# Patient Record
Sex: FEMALE | Race: BLACK OR AFRICAN AMERICAN | NOT HISPANIC OR LATINO | Employment: FULL TIME | URBAN - METROPOLITAN AREA
[De-identification: names, ages, dates, MRNs, and addresses within clinical notes are randomized per-mention and may not be internally consistent; named-entity substitution may affect disease eponyms.]

---

## 2024-02-07 PROBLEM — J01.90 ACUTE RHINOSINUSITIS: Status: ACTIVE | Noted: 2024-02-07

## 2024-02-07 PROBLEM — S49.90XA INJURY OF UPPER EXTREMITY: Status: ACTIVE | Noted: 2024-02-07

## 2024-02-07 PROBLEM — D64.9 ANEMIA: Status: ACTIVE | Noted: 2024-02-07

## 2024-02-07 PROBLEM — R52 BODY ACHES: Status: ACTIVE | Noted: 2024-02-07

## 2024-02-07 PROBLEM — R79.89 LOW TSH LEVEL: Status: ACTIVE | Noted: 2024-02-07

## 2024-02-07 PROBLEM — M25.519 SHOULDER PAIN: Status: ACTIVE | Noted: 2024-02-07

## 2024-02-07 PROBLEM — G47.10 HYPERSOMNIA: Status: ACTIVE | Noted: 2024-02-07

## 2024-02-07 PROBLEM — R53.83 FATIGUE: Status: ACTIVE | Noted: 2024-02-07

## 2024-02-07 PROBLEM — D50.9 ANEMIA, IRON DEFICIENCY: Status: ACTIVE | Noted: 2024-02-07

## 2024-02-07 PROBLEM — Z98.84 BARIATRIC SURGERY STATUS: Status: ACTIVE | Noted: 2024-02-07

## 2024-02-07 PROBLEM — K52.9 CHRONIC DIARRHEA: Status: ACTIVE | Noted: 2024-02-07

## 2024-02-07 PROBLEM — R06.00 DYSPNEA: Status: ACTIVE | Noted: 2024-02-07

## 2024-02-07 PROBLEM — R29.818 SUSPECTED SLEEP APNEA: Status: ACTIVE | Noted: 2024-02-07

## 2024-02-07 PROBLEM — H65.90 SEROUS OTITIS MEDIA: Status: ACTIVE | Noted: 2024-02-07

## 2024-02-07 PROBLEM — Z98.84 WEIGHT GAIN FOLLOWING GASTRIC BYPASS SURGERY: Status: ACTIVE | Noted: 2024-02-07

## 2024-02-07 PROBLEM — H10.9 CONJUNCTIVITIS: Status: ACTIVE | Noted: 2024-02-07

## 2024-02-07 PROBLEM — H15.109 EPISCLERITIS: Status: ACTIVE | Noted: 2024-02-07

## 2024-02-07 PROBLEM — M62.462 GASTROCNEMIUS EQUINUS, LEFT: Status: ACTIVE | Noted: 2024-02-07

## 2024-02-07 PROBLEM — H57.10 PAIN IN EYE: Status: ACTIVE | Noted: 2024-02-07

## 2024-02-07 PROBLEM — K91.2 MALNUTRITION FOLLOWING GASTROINTESTINAL SURGERY (HHS-HCC): Status: ACTIVE | Noted: 2024-02-07

## 2024-02-07 PROBLEM — R73.03 PREDIABETES: Status: ACTIVE | Noted: 2024-02-07

## 2024-02-07 PROBLEM — F32.A DEPRESSION: Status: ACTIVE | Noted: 2024-02-07

## 2024-02-07 PROBLEM — M72.2 PLANTAR FASCIITIS OF LEFT FOOT: Status: ACTIVE | Noted: 2024-02-07

## 2024-02-07 PROBLEM — R05.9 COUGH: Status: ACTIVE | Noted: 2024-02-07

## 2024-02-07 PROBLEM — E66.01 MORBID OBESITY (MULTI): Status: ACTIVE | Noted: 2024-02-07

## 2024-02-07 PROBLEM — E55.9 VITAMIN D DEFICIENCY: Status: ACTIVE | Noted: 2024-02-07

## 2024-02-07 PROBLEM — G47.33 OSA ON CPAP: Status: ACTIVE | Noted: 2024-02-07

## 2024-02-07 PROBLEM — R63.5 WEIGHT GAIN FOLLOWING GASTRIC BYPASS SURGERY: Status: ACTIVE | Noted: 2024-02-07

## 2024-02-07 PROBLEM — K21.9 GERD (GASTROESOPHAGEAL REFLUX DISEASE): Status: ACTIVE | Noted: 2024-02-07

## 2024-02-07 PROBLEM — J30.2 SEASONAL ALLERGIES: Status: ACTIVE | Noted: 2024-02-07

## 2024-02-07 PROBLEM — G47.8 HYPNOTIC-DEPENDENT SLEEP DISORDER: Status: ACTIVE | Noted: 2024-02-07

## 2024-02-07 PROBLEM — B35.3 TINEA PEDIS: Status: ACTIVE | Noted: 2024-02-07

## 2024-02-08 ENCOUNTER — OFFICE VISIT (OUTPATIENT)
Dept: PRIMARY CARE | Facility: CLINIC | Age: 49
End: 2024-02-08
Payer: COMMERCIAL

## 2024-02-08 ENCOUNTER — LAB (OUTPATIENT)
Dept: LAB | Facility: LAB | Age: 49
End: 2024-02-08
Payer: COMMERCIAL

## 2024-02-08 VITALS
HEIGHT: 66 IN | DIASTOLIC BLOOD PRESSURE: 90 MMHG | OXYGEN SATURATION: 98 % | HEART RATE: 80 BPM | RESPIRATION RATE: 18 BRPM | SYSTOLIC BLOOD PRESSURE: 140 MMHG | BODY MASS INDEX: 38.09 KG/M2 | WEIGHT: 237 LBS

## 2024-02-08 DIAGNOSIS — Z12.31 ENCOUNTER FOR SCREENING MAMMOGRAM FOR MALIGNANT NEOPLASM OF BREAST: ICD-10-CM

## 2024-02-08 DIAGNOSIS — Z98.84 STATUS POST BARIATRIC SURGERY: ICD-10-CM

## 2024-02-08 DIAGNOSIS — R03.0 ELEVATED BLOOD PRESSURE READING: ICD-10-CM

## 2024-02-08 DIAGNOSIS — Z00.00 HEALTHCARE MAINTENANCE: Primary | ICD-10-CM

## 2024-02-08 DIAGNOSIS — Z12.4 SCREENING FOR CERVICAL CANCER: ICD-10-CM

## 2024-02-08 DIAGNOSIS — Z00.00 HEALTHCARE MAINTENANCE: ICD-10-CM

## 2024-02-08 DIAGNOSIS — M76.60 ACHILLES TENDINITIS, UNSPECIFIED LATERALITY: ICD-10-CM

## 2024-02-08 DIAGNOSIS — R92.8 ABNORMAL MAMMOGRAM: ICD-10-CM

## 2024-02-08 DIAGNOSIS — D50.9 IRON DEFICIENCY ANEMIA, UNSPECIFIED IRON DEFICIENCY ANEMIA TYPE: Primary | ICD-10-CM

## 2024-02-08 LAB
ALBUMIN SERPL BCP-MCNC: 4.3 G/DL (ref 3.4–5)
ALP SERPL-CCNC: 83 U/L (ref 33–110)
ALT SERPL W P-5'-P-CCNC: 25 U/L (ref 7–45)
ANION GAP SERPL CALC-SCNC: 17 MMOL/L (ref 10–20)
AST SERPL W P-5'-P-CCNC: 26 U/L (ref 9–39)
BASOPHILS # BLD AUTO: 0.03 X10*3/UL (ref 0–0.1)
BASOPHILS NFR BLD AUTO: 0.4 %
BILIRUB SERPL-MCNC: 0.2 MG/DL (ref 0–1.2)
BUN SERPL-MCNC: 19 MG/DL (ref 6–23)
CALCIUM SERPL-MCNC: 9.7 MG/DL (ref 8.6–10.6)
CHLORIDE SERPL-SCNC: 103 MMOL/L (ref 98–107)
CHOLEST SERPL-MCNC: 173 MG/DL (ref 0–199)
CHOLESTEROL/HDL RATIO: 2.4
CO2 SERPL-SCNC: 28 MMOL/L (ref 21–32)
CREAT SERPL-MCNC: 0.8 MG/DL (ref 0.5–1.05)
EGFRCR SERPLBLD CKD-EPI 2021: >90 ML/MIN/1.73M*2
EOSINOPHIL # BLD AUTO: 0.04 X10*3/UL (ref 0–0.7)
EOSINOPHIL NFR BLD AUTO: 0.5 %
ERYTHROCYTE [DISTWIDTH] IN BLOOD BY AUTOMATED COUNT: 16.9 % (ref 11.5–14.5)
EST. AVERAGE GLUCOSE BLD GHB EST-MCNC: 134 MG/DL
FERRITIN SERPL-MCNC: 15 NG/ML (ref 8–150)
FOLATE SERPL-MCNC: 21.7 NG/ML
GLUCOSE SERPL-MCNC: 81 MG/DL (ref 74–99)
HBA1C MFR BLD: 6.3 %
HCT VFR BLD AUTO: 36.5 % (ref 36–46)
HDLC SERPL-MCNC: 72.1 MG/DL
HGB BLD-MCNC: 10.9 G/DL (ref 12–16)
IMM GRANULOCYTES # BLD AUTO: 0.02 X10*3/UL (ref 0–0.7)
IMM GRANULOCYTES NFR BLD AUTO: 0.3 % (ref 0–0.9)
IRON SATN MFR SERPL: ABNORMAL %
IRON SERPL-MCNC: 17 UG/DL (ref 35–150)
LDLC SERPL DIRECT ASSAY-MCNC: 82 MG/DL (ref 0–129)
LYMPHOCYTES # BLD AUTO: 1.84 X10*3/UL (ref 1.2–4.8)
LYMPHOCYTES NFR BLD AUTO: 23.4 %
MCH RBC QN AUTO: 22.6 PG (ref 26–34)
MCHC RBC AUTO-ENTMCNC: 29.9 G/DL (ref 32–36)
MCV RBC AUTO: 76 FL (ref 80–100)
MONOCYTES # BLD AUTO: 0.56 X10*3/UL (ref 0.1–1)
MONOCYTES NFR BLD AUTO: 7.1 %
NEUTROPHILS # BLD AUTO: 5.37 X10*3/UL (ref 1.2–7.7)
NEUTROPHILS NFR BLD AUTO: 68.3 %
NON-HDL CHOLESTEROL: 101 MG/DL (ref 0–149)
NRBC BLD-RTO: 0 /100 WBCS (ref 0–0)
PLATELET # BLD AUTO: 324 X10*3/UL (ref 150–450)
POTASSIUM SERPL-SCNC: 4.1 MMOL/L (ref 3.5–5.3)
PROT SERPL-MCNC: 7.9 G/DL (ref 6.4–8.2)
RBC # BLD AUTO: 4.82 X10*6/UL (ref 4–5.2)
SODIUM SERPL-SCNC: 144 MMOL/L (ref 136–145)
T4 FREE SERPL-MCNC: 1.17 NG/DL (ref 0.78–1.48)
TIBC SERPL-MCNC: ABNORMAL UG/DL
TSH SERPL-ACNC: 0.42 MIU/L (ref 0.44–3.98)
UIBC SERPL-MCNC: >450 UG/DL (ref 110–370)
VIT B12 SERPL-MCNC: 345 PG/ML (ref 211–911)
WBC # BLD AUTO: 7.9 X10*3/UL (ref 4.4–11.3)

## 2024-02-08 PROCEDURE — 84439 ASSAY OF FREE THYROXINE: CPT

## 2024-02-08 PROCEDURE — 84443 ASSAY THYROID STIM HORMONE: CPT

## 2024-02-08 PROCEDURE — 83036 HEMOGLOBIN GLYCOSYLATED A1C: CPT

## 2024-02-08 PROCEDURE — 36415 COLL VENOUS BLD VENIPUNCTURE: CPT

## 2024-02-08 PROCEDURE — 85025 COMPLETE CBC W/AUTO DIFF WBC: CPT

## 2024-02-08 PROCEDURE — 80053 COMPREHEN METABOLIC PANEL: CPT

## 2024-02-08 PROCEDURE — 83718 ASSAY OF LIPOPROTEIN: CPT

## 2024-02-08 PROCEDURE — 83721 ASSAY OF BLOOD LIPOPROTEIN: CPT

## 2024-02-08 PROCEDURE — 82652 VIT D 1 25-DIHYDROXY: CPT

## 2024-02-08 PROCEDURE — 82607 VITAMIN B-12: CPT

## 2024-02-08 PROCEDURE — 83540 ASSAY OF IRON: CPT

## 2024-02-08 PROCEDURE — 82465 ASSAY BLD/SERUM CHOLESTEROL: CPT

## 2024-02-08 PROCEDURE — 83550 IRON BINDING TEST: CPT

## 2024-02-08 PROCEDURE — 99396 PREV VISIT EST AGE 40-64: CPT | Performed by: INTERNAL MEDICINE

## 2024-02-08 PROCEDURE — 82728 ASSAY OF FERRITIN: CPT

## 2024-02-08 PROCEDURE — 3008F BODY MASS INDEX DOCD: CPT | Performed by: INTERNAL MEDICINE

## 2024-02-08 PROCEDURE — 82746 ASSAY OF FOLIC ACID SERUM: CPT

## 2024-02-08 ASSESSMENT — ENCOUNTER SYMPTOMS
DYSURIA: 0
NECK STIFFNESS: 0
APPETITE CHANGE: 0
CHILLS: 0
DIZZINESS: 0
NECK PAIN: 0
FEVER: 0
DIFFICULTY URINATING: 0
DEPRESSION: 0
SORE THROAT: 0
OCCASIONAL FEELINGS OF UNSTEADINESS: 0
FATIGUE: 0
HEMATURIA: 0
FREQUENCY: 0
ABDOMINAL PAIN: 0
LOSS OF SENSATION IN FEET: 0
RHINORRHEA: 0
NUMBNESS: 0
ABDOMINAL DISTENTION: 0
BACK PAIN: 0
NAUSEA: 0
HEADACHES: 0
DIARRHEA: 0
CONSTIPATION: 0
JOINT SWELLING: 0
DIAPHORESIS: 0
LIGHT-HEADEDNESS: 0
ARTHRALGIAS: 0
MYALGIAS: 0
COUGH: 0
WEAKNESS: 0
WHEEZING: 0
BLOOD IN STOOL: 0
SINUS PRESSURE: 0
SHORTNESS OF BREATH: 0
VOMITING: 0
PALPITATIONS: 0

## 2024-02-08 ASSESSMENT — PATIENT HEALTH QUESTIONNAIRE - PHQ9
2. FEELING DOWN, DEPRESSED OR HOPELESS: NOT AT ALL
1. LITTLE INTEREST OR PLEASURE IN DOING THINGS: NOT AT ALL
SUM OF ALL RESPONSES TO PHQ9 QUESTIONS 1 AND 2: 0

## 2024-02-08 NOTE — PROGRESS NOTES
"Subjective   Patient ID: Cristy Coleman is a 48 y.o. female who presents for Annual Exam (Mammogram order/).    HPI   She presents for physical.     Review of Systems   Constitutional:  Negative for appetite change, chills, diaphoresis, fatigue and fever.   HENT:  Negative for congestion, ear discharge, ear pain, hearing loss, postnasal drip, rhinorrhea, sinus pressure, sore throat and tinnitus.    Eyes:  Negative for visual disturbance.   Respiratory:  Negative for cough, shortness of breath and wheezing.    Cardiovascular:  Negative for chest pain, palpitations and leg swelling.   Gastrointestinal:  Negative for abdominal distention, abdominal pain, blood in stool, constipation, diarrhea, nausea and vomiting.   Genitourinary:  Negative for decreased urine volume, difficulty urinating, dysuria, frequency, hematuria and urgency.   Musculoskeletal:  Negative for arthralgias, back pain, gait problem, joint swelling, myalgias, neck pain and neck stiffness.   Skin:  Negative for rash.   Neurological:  Negative for dizziness, weakness, light-headedness, numbness and headaches.         Objective   /90   Pulse 80   Resp 18   Ht 1.676 m (5' 6\")   Wt 108 kg (237 lb)   SpO2 98%   BMI 38.25 kg/m²     Physical Exam  Vitals reviewed.   Constitutional:       Appearance: Normal appearance. She is normal weight.   HENT:      Right Ear: Tympanic membrane and external ear normal.      Left Ear: Tympanic membrane and external ear normal.   Eyes:      Extraocular Movements: Extraocular movements intact.      Conjunctiva/sclera: Conjunctivae normal.      Pupils: Pupils are equal, round, and reactive to light.   Cardiovascular:      Rate and Rhythm: Normal rate and regular rhythm.      Pulses: Normal pulses.   Pulmonary:      Effort: Pulmonary effort is normal.      Breath sounds: Normal breath sounds.   Abdominal:      General: Bowel sounds are normal.      Palpations: Abdomen is soft.   Musculoskeletal:         General: " Normal range of motion.      Cervical back: Normal range of motion.   Skin:     General: Skin is warm and dry.   Neurological:      General: No focal deficit present.      Mental Status: She is oriented to person, place, and time.   Psychiatric:         Mood and Affect: Mood normal.         Behavior: Behavior normal.           Assessment/Plan   Problem List Items Addressed This Visit             ICD-10-CM    Healthcare maintenance - Primary Z00.00    Relevant Orders    CBC and Auto Differential    Cholesterol, LDL Direct    Comprehensive Metabolic Panel    Hemoglobin A1C    Lipid Panel Non-Fasting    TSH with reflex to Free T4 if abnormal    BI mammo bilateral screening tomosynthesis    Colonoscopy Screening; Average Risk Patient    Screening for cervical cancer Z12.4    Relevant Orders    Referral to Gynecology    Achilles tendinitis M76.60    Relevant Orders    Referral to Podiatry    Encounter for screening mammogram for malignant neoplasm of breast Z12.31    Relevant Orders    BI mammo bilateral screening tomosynthesis    BMI 38.0-38.9,adult Z68.38    Relevant Orders    Referral to [facility]    Status post bariatric surgery Z98.84    Relevant Orders    Ferritin    Folate    Iron and TIBC    Vitamin B12    Vitamin D 1,25 Dihydroxy (for eval of hypercalcemia)    Elevated blood pressure reading R03.0    Relevant Orders    Comprehensive Metabolic Panel    TSH with reflex to Free T4 if abnormal   RTC in 1 year, sooner if needed

## 2024-02-08 NOTE — LETTER
February 20, 2024 April JANNA Jared  [address] [place] [address]      Dear Ms. Coleman:    Below are the results from your recent visit:    Chronic anemia stable. TIBC upper limit normal/ high, iron saturation lower limit normal/ low, ferritin is low which indicates iron deficiency anemia. B12 and folate levels are normal. I recommend taking OTC iron supplement 325 (65) mg daily.      Diabetes mellitus type 2, blood sugars controlled.     Blood test otherwise unremarkable.         If you have any questions or concerns, please don't hesitate to call.

## 2024-02-08 NOTE — LETTER
February 8, 2024     Patient: Cristy Coleman   YOB: 1975   Date of Visit: 2/8/2024       To Whom It May Concern:    Cristy Coleman was seen in my clinic on 2/8/2024 at 3:00 pm. Please excuse April for her absence from work on this day to make the appointment.    If you have any questions or concerns, please don't hesitate to call.         Sincerely,         Shay Gurrola MD        CC: No Recipients

## 2024-02-10 LAB — 1,25(OH)2D3 SERPL-MCNC: 53.3 PG/ML (ref 19.9–79.3)

## 2024-02-13 RX ORDER — FERROUS SULFATE 325(65) MG
325 TABLET, DELAYED RELEASE (ENTERIC COATED) ORAL
Qty: 90 TABLET | Refills: 1 | Status: SHIPPED | OUTPATIENT
Start: 2024-02-13 | End: 2024-08-11

## 2024-02-13 NOTE — RESULT ENCOUNTER NOTE
Chronic anemia stable. TIBC upper limit normal/ high, iron saturation lower limit normal/ low, ferritin is low which indicates iron deficiency anemia. B12 and folate levels are normal. I recommend taking OTC iron supplement 325 (65) mg daily.      Diabetes mellitus type 2, blood sugars controlled.     Blood test otherwise unremarkable.

## 2024-02-28 ENCOUNTER — HOSPITAL ENCOUNTER (OUTPATIENT)
Dept: RADIOLOGY | Facility: CLINIC | Age: 49
Discharge: HOME | End: 2024-02-28
Payer: COMMERCIAL

## 2024-02-28 DIAGNOSIS — Z12.31 ENCOUNTER FOR SCREENING MAMMOGRAM FOR MALIGNANT NEOPLASM OF BREAST: ICD-10-CM

## 2024-02-28 DIAGNOSIS — Z00.00 HEALTHCARE MAINTENANCE: ICD-10-CM

## 2024-02-28 PROCEDURE — 77063 BREAST TOMOSYNTHESIS BI: CPT | Performed by: STUDENT IN AN ORGANIZED HEALTH CARE EDUCATION/TRAINING PROGRAM

## 2024-02-28 PROCEDURE — 77067 SCR MAMMO BI INCL CAD: CPT | Performed by: STUDENT IN AN ORGANIZED HEALTH CARE EDUCATION/TRAINING PROGRAM

## 2024-02-28 PROCEDURE — 77067 SCR MAMMO BI INCL CAD: CPT

## 2024-03-04 ENCOUNTER — TELEPHONE (OUTPATIENT)
Dept: PRIMARY CARE | Facility: CLINIC | Age: 49
End: 2024-03-04
Payer: COMMERCIAL

## 2024-03-04 NOTE — RESULT ENCOUNTER NOTE
Your mammogram revealed some changes in the left breast tissue which need further investigation. These changes are likely benign (non-cancerous) however to be certain a breast ultrasound and diagnostic mammogram has been ordered. Please call to schedule.

## 2024-03-04 NOTE — TELEPHONE ENCOUNTER
Result Communication    Resulted Orders   BI mammo bilateral screening tomosynthesis    Narrative    Interpreted By:  Chris Metcalf,   STUDY:  BI MAMMO BILATERAL SCREENING TOMOSYNTHESIS;  2/28/2024 2:55 pm      ACCESSION NUMBER(S):  [id number]      ORDERING CLINICIAN:  ASHIA CHARLES      INDICATION:  Baseline screening. Family history of ovarian cancer in paternal  grandmother diagnosed at age 60.      COMPARISON:  None. Baseline exam.      FINDINGS:  2D and tomosynthesis images were reviewed at 1 mm slice thickness.      Density:  The breast tissue is heterogeneously dense, which may  obscure small masses.      There is diffuse global asymmetry in the left breast relative to the  right breast with a large focal asymmetry in central lateral breast  at anterior depth, best appreciated on the CC view. Additional  asymmetries are appreciated in medial breast at posterior depth on  the CC view and in superior breast on the MLO view. No suspicious  masses or calcifications are identified in the right breast.        Impression    1. Indeterminate left breast global asymmetry more prominent in  central outer breast and additional asymmetries. Additional  evaluation is recommended with diagnostic mammogram and ultrasound.  2. No mammographic evidence of malignancy in the right breast.      BI-RADS CATEGORY:      BI-RADS Category:  0 Incomplete; Need Additional Imaging Evaluation  and/or Prior Mammograms for Comparison. Recommendation:  Additional  Imaging. Recommended Date:  Immediate.  Laterality:  Left.      For any future breast imaging appointments, please call [phone]).          MACRO:  None      Signed by: Chris Metcalf 3/1/2024 4:57 PM  Dictation workstation:   [id number]       10:52 AM      Results were successfully communicated with the patient and they acknowledged their understanding.

## 2024-03-04 NOTE — TELEPHONE ENCOUNTER
----- Message from Shay Gurrola MD sent at 3/4/2024  7:37 AM EST -----  Your mammogram revealed some changes in the left breast tissue which need further investigation. These changes are likely benign (non-cancerous) however to be certain a breast ultrasound and diagnostic mammogram has been ordered. Please call to schedule.

## 2024-03-11 ENCOUNTER — OFFICE VISIT (OUTPATIENT)
Dept: PODIATRY | Facility: CLINIC | Age: 49
End: 2024-03-11
Payer: COMMERCIAL

## 2024-03-11 DIAGNOSIS — M62.462 GASTROCNEMIUS EQUINUS OF LEFT LOWER EXTREMITY: Primary | ICD-10-CM

## 2024-03-11 DIAGNOSIS — M76.60 ACHILLES TENDINITIS, UNSPECIFIED LATERALITY: ICD-10-CM

## 2024-03-11 PROCEDURE — 99202 OFFICE O/P NEW SF 15 MIN: CPT | Performed by: PODIATRIST

## 2024-03-11 PROCEDURE — 3008F BODY MASS INDEX DOCD: CPT | Performed by: PODIATRIST

## 2024-03-11 RX ORDER — MELOXICAM 15 MG/1
15 TABLET ORAL DAILY
Qty: 30 TABLET | Refills: 2 | Status: SHIPPED | OUTPATIENT
Start: 2024-03-11 | End: 2025-03-11

## 2024-03-11 NOTE — PROGRESS NOTES
History of Present Illness:   Patient states they are here for foot exam  Has left heel pain  Pain when walking  States it has been problem for many months  Denies trauma  States it has since improved a bit  No other pedal complaints      Past Medical History  Past Medical History:   Diagnosis Date    Encounter for other general examination     Podiatry visit, routine    Morbid (severe) obesity due to excess calories (CMS/[facility]) 04/20/2021    Class 3 severe obesity with body mass index (BMI) of 40.0 to 44.9 in adult    Personal history of diseases of the skin and subcutaneous tissue     History of ingrown nail       Medications and Allergies have been reviewed.    Review Of Systems:  GENERAL: No weight loss, malaise or fevers.  HEENT: Negative for frequent or significant headaches,   RESPIRATORY: Negative for cough, wheezing or shortness of breath.  CARDIOVASCULAR: Negative for chest pain, leg swelling or palpitations.    Examination of Both Lower Extremities:   Objective:   Vasc: DP and PT pulses are palpable bilateral.  CFT is less than 3 seconds bilateral.  Skin temperature is warm to cool proximal to distal bilateral.      Neuro: Vibratory, light touch and proprioception are intact bilateral.  Protective sensation is intact to the foot when tested with the 5.07 SWM bilateral.  Achilles reflex is 2/4 bilateral.  There is no clonus noted.  The hallux is downgoing bilateral.      Derm: Nails 1-5 bilateral are intact.  Skin is supple with normal texture and turgor noted.  Webspaces are clean, dry and intact bilateral.  There are no hyperkeratoses, ulcerations, verruca or other lesions noted.      Ortho: Muscle strength is 5/5 for all pedal groups tested. Left posterior calc notes pain at achilles insertion. Decrease Rom to ankle joint with ankle flexed and extended.     1. Gastrocnemius equinus of left lower extremity  Referral to Physical Therapy    meloxicam (Mobic) 15 mg tablet      2. Achilles tendinitis,  unspecified laterality  Referral to Podiatry    Referral to Physical Therapy    meloxicam (Mobic) 15 mg tablet        Patient exam and eval  Discussed achilles tendonitis.   No sinus tarsi symptoms noted.   Discussed causes, symptoms, aggravating factors and treatment options  Discussed radiographs, deferred  Recommend stiff supportive shoe gear  Shoes that do not twist or bend  Called in mobic to take  Discussed stretching. Gave rx for PT  Discussed ice, nsaids, dario hilton/biofreeze  Patient to follow up if no improvement noted.   Pt in agreement to plan  All questions answered      Nafisa Oliveira DPM  [phone]  Option 2  Fax: [phone]

## 2024-03-12 ENCOUNTER — APPOINTMENT (OUTPATIENT)
Dept: RADIOLOGY | Facility: HOSPITAL | Age: 49
End: 2024-03-12
Payer: COMMERCIAL

## 2024-03-19 ENCOUNTER — ANCILLARY ORDERS (OUTPATIENT)
Dept: PRIMARY CARE | Facility: CLINIC | Age: 49
End: 2024-03-19
Payer: COMMERCIAL

## 2024-03-19 ENCOUNTER — HOSPITAL ENCOUNTER (OUTPATIENT)
Dept: RADIOLOGY | Facility: HOSPITAL | Age: 49
Discharge: HOME | End: 2024-03-19
Payer: COMMERCIAL

## 2024-03-19 DIAGNOSIS — R03.0 ELEVATED BLOOD PRESSURE READING: ICD-10-CM

## 2024-03-19 DIAGNOSIS — M76.60 ACHILLES TENDINITIS, UNSPECIFIED LATERALITY: ICD-10-CM

## 2024-03-19 DIAGNOSIS — R92.8 ABNORMAL MAMMOGRAM: ICD-10-CM

## 2024-03-19 DIAGNOSIS — Z00.00 HEALTHCARE MAINTENANCE: Primary | ICD-10-CM

## 2024-03-19 DIAGNOSIS — Z12.31 ENCOUNTER FOR SCREENING MAMMOGRAM FOR MALIGNANT NEOPLASM OF BREAST: ICD-10-CM

## 2024-03-19 DIAGNOSIS — Z12.4 SCREENING FOR CERVICAL CANCER: ICD-10-CM

## 2024-03-19 DIAGNOSIS — Z98.84 STATUS POST BARIATRIC SURGERY: ICD-10-CM

## 2024-03-19 PROCEDURE — 76642 ULTRASOUND BREAST LIMITED: CPT | Mod: LT

## 2024-03-19 PROCEDURE — 77061 BREAST TOMOSYNTHESIS UNI: CPT | Mod: LT

## 2024-03-19 PROCEDURE — 76982 USE 1ST TARGET LESION: CPT | Mod: LT

## 2024-03-19 NOTE — RESULT ENCOUNTER NOTE
Your diagnostic left breast mammogram revealed there are 2 oval solid benign (non-cancerous) masses within the left breast which requires closer monitoring with left breast ultrasound in 6 months.  Order has been placed please call to schedule (for September).

## 2024-03-20 ENCOUNTER — TELEPHONE (OUTPATIENT)
Dept: PRIMARY CARE | Facility: CLINIC | Age: 49
End: 2024-03-20
Payer: COMMERCIAL

## 2024-03-20 NOTE — TELEPHONE ENCOUNTER
----- Message from Shay Gurrola MD sent at 3/19/2024 12:50 PM EDT -----  Your diagnostic left breast mammogram revealed there are 2 oval solid benign (non-cancerous) masses within the left breast which requires closer monitoring with left breast ultrasound in 6 months.  Order has been placed please call to schedule (for September).

## 2024-03-20 NOTE — TELEPHONE ENCOUNTER
Result Communication    Resulted Orders   BI mammo left diagnostic tomosynthesis    Narrative    Interpreted By:  Gabrielle Meeks,   STUDY:  BI MAMMO LEFT DIAGNOSTIC TOMOSYNTHESIS; BI US BREAST LIMITED LEFT;  3/19/2024 8:55 am; 3/19/2024 9:31 am      ACCESSION NUMBER(S):  [id number]; [id number]      ORDERING CLINICIAN:  ASHIA CHARLES      INDICATION:  Global asymmetry left breast on baseline examination with additional  asymmetries within the posterior left breast.      COMPARISON:  Baseline study of 02/28/2024      FINDINGS:  MAMMOGRAPHY: 2D and tomosynthesis images were reviewed at 1 mm slice  thickness.      There is a global asymmetry identified within the anterior depth of  the left breast extending into the middle depth of the left breast  with diagnostic images showing no obvious underlying mass or  architectural distortion. However, the breast tissue is quite dense  at this site.      There is a oval 1 cm nodular density identified in the posterior  depth of the left breast near the 11:00 to 12:00 location at a  distance 7 cm from the nipple. There may be a 2nd nodular density 8  cm from the nipple towards the 1 o'clock location as well. .      ULTRASOUND:  Targeted grayscale sonography with color flow imaging  and elastography on the left is performed. Within the retroareolar  aspect of the left breast, there is a solid hypoechoic mass measuring  1.2 x 1.4 x 1.9 cm in size with parallel orientation. The margins of  this mass blend into the adjacent fibroglandular tissue. On color  flow imaging, mass is not hypervascular. Elastography shows that the  lesion is soft.      At the 11 o'clock location 6-1/2 cm from the nipple, there is a  hypoechoic solid mass seen which measures 0.6 x 1.1 x 1.2 cm in size  exhibiting parallel orientation. On elastography, this lesion is soft  without internal color flow.      No sonographic abnormality is visible at the 1 o'clock position.        Impression    There are 2 oval  solid masses seen within the left breast as outlined  above which I suspect most likely represent fibroadenomas. I would  recommend follow-up left mammogram and left breast ultrasound in 6  months.      BI-RADS CATEGORY:      BI-RADS Category:  3 Probably Benign.  Recommendation:  Short-term Interval Follow-up Imaging.  Recommended Date:  6 Months.  Laterality:  Left      For any future breast imaging appointments, please call [phone]  ([phone]).          MACRO:  None      Signed by: Gabrielle Meeks 3/19/2024 9:42 AM  Dictation workstation:   [id number]       11:48 AM      Results were successfully communicated with the patient and they acknowledged their understanding.

## 2024-09-03 ENCOUNTER — APPOINTMENT (OUTPATIENT)
Dept: INTEGRATIVE MEDICINE | Facility: CLINIC | Age: 49
End: 2024-09-03
Payer: COMMERCIAL

## 2024-09-12 ENCOUNTER — LAB (OUTPATIENT)
Dept: LAB | Facility: LAB | Age: 49
End: 2024-09-12
Payer: COMMERCIAL

## 2024-09-12 DIAGNOSIS — E66.01 MORBID (SEVERE) OBESITY DUE TO EXCESS CALORIES (MULTI): ICD-10-CM

## 2024-09-12 DIAGNOSIS — R53.83 OTHER FATIGUE: ICD-10-CM

## 2024-09-12 DIAGNOSIS — R63.5 ABNORMAL WEIGHT GAIN: ICD-10-CM

## 2024-09-12 DIAGNOSIS — E88.810 METABOLIC SYNDROME: Primary | ICD-10-CM

## 2024-09-12 LAB
25(OH)D3 SERPL-MCNC: 23 NG/ML (ref 30–100)
ALBUMIN SERPL BCP-MCNC: 4 G/DL (ref 3.4–5)
ALP SERPL-CCNC: 72 U/L (ref 33–110)
ALT SERPL W P-5'-P-CCNC: 17 U/L (ref 7–45)
ANION GAP SERPL CALC-SCNC: 13 MMOL/L (ref 10–20)
AST SERPL W P-5'-P-CCNC: 19 U/L (ref 9–39)
BASOPHILS # BLD AUTO: 0.05 X10*3/UL (ref 0–0.1)
BASOPHILS NFR BLD AUTO: 1.1 %
BILIRUB SERPL-MCNC: 0.5 MG/DL (ref 0–1.2)
BUN SERPL-MCNC: 13 MG/DL (ref 6–23)
CALCIUM SERPL-MCNC: 9.3 MG/DL (ref 8.6–10.6)
CHLORIDE SERPL-SCNC: 105 MMOL/L (ref 98–107)
CHOLEST SERPL-MCNC: 144 MG/DL (ref 0–199)
CHOLESTEROL/HDL RATIO: 2.4
CO2 SERPL-SCNC: 26 MMOL/L (ref 21–32)
CREAT SERPL-MCNC: 0.74 MG/DL (ref 0.5–1.05)
EGFRCR SERPLBLD CKD-EPI 2021: >90 ML/MIN/1.73M*2
EOSINOPHIL # BLD AUTO: 0.04 X10*3/UL (ref 0–0.7)
EOSINOPHIL NFR BLD AUTO: 0.9 %
ERYTHROCYTE [DISTWIDTH] IN BLOOD BY AUTOMATED COUNT: 17.4 % (ref 11.5–14.5)
GLUCOSE SERPL-MCNC: 100 MG/DL (ref 74–99)
HCT VFR BLD AUTO: 33.2 % (ref 36–46)
HDLC SERPL-MCNC: 59.3 MG/DL
HGB BLD-MCNC: 9.9 G/DL (ref 12–16)
IMM GRANULOCYTES # BLD AUTO: 0 X10*3/UL (ref 0–0.7)
IMM GRANULOCYTES NFR BLD AUTO: 0 % (ref 0–0.9)
LDLC SERPL CALC-MCNC: 74 MG/DL
LIPASE SERPL-CCNC: 30 U/L (ref 9–82)
LYMPHOCYTES # BLD AUTO: 1.48 X10*3/UL (ref 1.2–4.8)
LYMPHOCYTES NFR BLD AUTO: 32.7 %
MCH RBC QN AUTO: 22.2 PG (ref 26–34)
MCHC RBC AUTO-ENTMCNC: 29.8 G/DL (ref 32–36)
MCV RBC AUTO: 74 FL (ref 80–100)
MONOCYTES # BLD AUTO: 0.46 X10*3/UL (ref 0.1–1)
MONOCYTES NFR BLD AUTO: 10.2 %
NEUTROPHILS # BLD AUTO: 2.49 X10*3/UL (ref 1.2–7.7)
NEUTROPHILS NFR BLD AUTO: 55.1 %
NON HDL CHOLESTEROL: 85 MG/DL (ref 0–149)
NRBC BLD-RTO: 0 /100 WBCS (ref 0–0)
PLATELET # BLD AUTO: 266 X10*3/UL (ref 150–450)
POTASSIUM SERPL-SCNC: 4 MMOL/L (ref 3.5–5.3)
PROT SERPL-MCNC: 7.1 G/DL (ref 6.4–8.2)
RBC # BLD AUTO: 4.46 X10*6/UL (ref 4–5.2)
SODIUM SERPL-SCNC: 140 MMOL/L (ref 136–145)
TRIGL SERPL-MCNC: 55 MG/DL (ref 0–149)
TSH SERPL-ACNC: 0.46 MIU/L (ref 0.44–3.98)
VIT B12 SERPL-MCNC: 710 PG/ML (ref 211–911)
VLDL: 11 MG/DL (ref 0–40)
WBC # BLD AUTO: 4.5 X10*3/UL (ref 4.4–11.3)

## 2024-09-12 PROCEDURE — 83690 ASSAY OF LIPASE: CPT

## 2024-09-12 PROCEDURE — 80061 LIPID PANEL: CPT

## 2024-09-12 PROCEDURE — 82607 VITAMIN B-12: CPT

## 2024-09-12 PROCEDURE — 82306 VITAMIN D 25 HYDROXY: CPT

## 2024-09-12 PROCEDURE — 80053 COMPREHEN METABOLIC PANEL: CPT

## 2024-09-12 PROCEDURE — 36415 COLL VENOUS BLD VENIPUNCTURE: CPT

## 2024-09-12 PROCEDURE — 84443 ASSAY THYROID STIM HORMONE: CPT

## 2024-09-12 PROCEDURE — 85025 COMPLETE CBC W/AUTO DIFF WBC: CPT

## 2025-02-13 ENCOUNTER — APPOINTMENT (OUTPATIENT)
Dept: PRIMARY CARE | Facility: CLINIC | Age: 50
End: 2025-02-13
Payer: COMMERCIAL